# Patient Record
Sex: FEMALE | Race: WHITE | NOT HISPANIC OR LATINO | Employment: FULL TIME | URBAN - METROPOLITAN AREA
[De-identification: names, ages, dates, MRNs, and addresses within clinical notes are randomized per-mention and may not be internally consistent; named-entity substitution may affect disease eponyms.]

---

## 2018-06-05 ENCOUNTER — RECORDS - HEALTHEAST (OUTPATIENT)
Dept: LAB | Facility: CLINIC | Age: 48
End: 2018-06-05

## 2018-06-05 LAB
ALBUMIN SERPL-MCNC: 3.8 G/DL (ref 3.5–5)
ALP SERPL-CCNC: 92 U/L (ref 45–120)
ALT SERPL W P-5'-P-CCNC: 71 U/L (ref 0–45)
ANION GAP SERPL CALCULATED.3IONS-SCNC: 12 MMOL/L (ref 5–18)
AST SERPL W P-5'-P-CCNC: 119 U/L (ref 0–40)
BILIRUB SERPL-MCNC: 0.6 MG/DL (ref 0–1)
BUN SERPL-MCNC: 12 MG/DL (ref 8–22)
CALCIUM SERPL-MCNC: 9.3 MG/DL (ref 8.5–10.5)
CHLORIDE BLD-SCNC: 102 MMOL/L (ref 98–107)
CHOLEST SERPL-MCNC: 228 MG/DL
CO2 SERPL-SCNC: 24 MMOL/L (ref 22–31)
CREAT SERPL-MCNC: 0.72 MG/DL (ref 0.6–1.1)
ERYTHROCYTE [DISTWIDTH] IN BLOOD BY AUTOMATED COUNT: 13.2 % (ref 11–14.5)
FASTING STATUS PATIENT QL REPORTED: YES
GFR SERPL CREATININE-BSD FRML MDRD: >60 ML/MIN/1.73M2
GLUCOSE BLD-MCNC: 216 MG/DL (ref 70–125)
HCT VFR BLD AUTO: 44.2 % (ref 35–47)
HDLC SERPL-MCNC: 46 MG/DL
HGB BLD-MCNC: 14.3 G/DL (ref 12–16)
LDLC SERPL CALC-MCNC: 139 MG/DL
MCH RBC QN AUTO: 29.1 PG (ref 27–34)
MCHC RBC AUTO-ENTMCNC: 32.4 G/DL (ref 32–36)
MCV RBC AUTO: 90 FL (ref 80–100)
PLATELET # BLD AUTO: 259 THOU/UL (ref 140–440)
PMV BLD AUTO: 11.5 FL (ref 8.5–12.5)
POTASSIUM BLD-SCNC: 4.2 MMOL/L (ref 3.5–5)
PROT SERPL-MCNC: 7.4 G/DL (ref 6–8)
RBC # BLD AUTO: 4.92 MILL/UL (ref 3.8–5.4)
SODIUM SERPL-SCNC: 138 MMOL/L (ref 136–145)
T4 FREE SERPL-MCNC: 0.9 NG/DL (ref 0.7–1.8)
TRIGL SERPL-MCNC: 214 MG/DL
TSH SERPL DL<=0.005 MIU/L-ACNC: 13.62 UIU/ML (ref 0.3–5)
WBC: 5.3 THOU/UL (ref 4–11)

## 2019-07-09 ENCOUNTER — AMBULATORY - HEALTHEAST (OUTPATIENT)
Dept: CARDIOLOGY | Facility: CLINIC | Age: 49
End: 2019-07-09

## 2019-07-09 ENCOUNTER — RECORDS - HEALTHEAST (OUTPATIENT)
Dept: ADMINISTRATIVE | Facility: OTHER | Age: 49
End: 2019-07-09

## 2019-07-11 ENCOUNTER — OFFICE VISIT - HEALTHEAST (OUTPATIENT)
Dept: CARDIOLOGY | Facility: CLINIC | Age: 49
End: 2019-07-11

## 2019-07-11 DIAGNOSIS — R00.2 PALPITATIONS: ICD-10-CM

## 2019-07-11 DIAGNOSIS — I47.10 PAROXYSMAL SUPRAVENTRICULAR TACHYCARDIA (H): ICD-10-CM

## 2019-07-11 LAB
ATRIAL RATE - MUSE: 90 BPM
DIASTOLIC BLOOD PRESSURE - MUSE: NORMAL MMHG
INTERPRETATION ECG - MUSE: NORMAL
P AXIS - MUSE: 18 DEGREES
PR INTERVAL - MUSE: 156 MS
QRS DURATION - MUSE: 86 MS
QT - MUSE: 372 MS
QTC - MUSE: 455 MS
R AXIS - MUSE: 4 DEGREES
SYSTOLIC BLOOD PRESSURE - MUSE: NORMAL MMHG
T AXIS - MUSE: 34 DEGREES
VENTRICULAR RATE- MUSE: 90 BPM

## 2019-07-11 RX ORDER — LOSARTAN POTASSIUM AND HYDROCHLOROTHIAZIDE 12.5; 5 MG/1; MG/1
1 TABLET ORAL DAILY
Refills: 1 | Status: SHIPPED | COMMUNITY
Start: 2019-06-14

## 2019-07-11 RX ORDER — METOPROLOL TARTRATE 50 MG
50 TABLET ORAL 2 TIMES DAILY PRN
Qty: 10 TABLET | Refills: 3 | Status: SHIPPED | OUTPATIENT
Start: 2019-07-11 | End: 2023-01-27

## 2019-07-11 RX ORDER — LEVOTHYROXINE SODIUM 125 UG/1
125 TABLET ORAL DAILY
Refills: 1 | Status: SHIPPED | COMMUNITY
Start: 2019-06-14

## 2019-07-11 ASSESSMENT — MIFFLIN-ST. JEOR: SCORE: 1810.68

## 2019-07-22 ENCOUNTER — HOSPITAL ENCOUNTER (OUTPATIENT)
Dept: CARDIOLOGY | Facility: CLINIC | Age: 49
Discharge: HOME | End: 2019-07-22
Attending: INTERNAL MEDICINE

## 2019-07-22 DIAGNOSIS — I47.10 PAROXYSMAL SUPRAVENTRICULAR TACHYCARDIA (H): ICD-10-CM

## 2019-07-22 DIAGNOSIS — R00.2 PALPITATIONS: ICD-10-CM

## 2019-07-22 LAB
AORTIC ROOT: 3.1 CM
BSA FOR ECHO PROCEDURE: 2.32 M2
CV BLOOD PRESSURE: ABNORMAL MMHG
CV ECHO HEIGHT: 66 IN
CV ECHO WEIGHT: 256 LBS
DOP CALC LVOT PEAK VEL: 93.2 CM/S
DOP CALCLVOT PEAK VEL VTI: 18 CM
EJECTION FRACTION: 72 % (ref 55–75)
FRACTIONAL SHORTENING: 36.5 % (ref 28–44)
INTERVENTRICULAR SEPTUM IN END DIASTOLE: 1.1 CM (ref 0.6–0.9)
IVS/PW RATIO: 1.1
LA AREA 1: 11.6 CM2
LA AREA 2: 13.5 CM2
LEFT ATRIUM LENGTH: 4.15 CM
LEFT ATRIUM SIZE: 3.8 CM
LEFT ATRIUM TO AORTIC ROOT RATIO: 1.23 NO UNITS
LEFT ATRIUM VOLUME INDEX: 13.8 ML/M2
LEFT ATRIUM VOLUME: 32.1 ML
LEFT VENTRICLE DIASTOLIC VOLUME INDEX: 20.3 CM3/M2 (ref 29–61)
LEFT VENTRICLE DIASTOLIC VOLUME: 47 CM3 (ref 46–106)
LEFT VENTRICLE HEART RATE: 86 BPM
LEFT VENTRICLE MASS INDEX: 80.8 G/M2
LEFT VENTRICLE SYSTOLIC VOLUME INDEX: 5.6 CM3/M2 (ref 8–24)
LEFT VENTRICLE SYSTOLIC VOLUME: 13 CM3 (ref 14–42)
LEFT VENTRICULAR INTERNAL DIMENSION IN DIASTOLE: 4.91 CM (ref 3.8–5.2)
LEFT VENTRICULAR INTERNAL DIMENSION IN SYSTOLE: 3.12 CM (ref 2.2–3.5)
LEFT VENTRICULAR MASS: 187.5 G
LEFT VENTRICULAR OUTFLOW TRACT MEAN GRADIENT: 2 MMHG
LEFT VENTRICULAR OUTFLOW TRACT MEAN VELOCITY: 69.1 CM/S
LEFT VENTRICULAR OUTFLOW TRACT PEAK GRADIENT: 3 MMHG
LEFT VENTRICULAR POSTERIOR WALL IN END DIASTOLE: 0.99 CM (ref 0.6–0.9)
MITRAL VALVE E/A RATIO: 1.1
MV AVERAGE E/E' RATIO: 7 CM/S
MV DECELERATION TIME: 243 MS
MV E'TISSUE VEL-LAT: 14.9 CM/S
MV E'TISSUE VEL-MED: 7.51 CM/S
MV LATERAL E/E' RATIO: 5.3
MV MEDIAL E/E' RATIO: 10.5
MV PEAK A VELOCITY: 71.1 CM/S
MV PEAK E VELOCITY: 78.5 CM/S
NUC REST DIASTOLIC VOLUME INDEX: 4096 LBS
NUC REST SYSTOLIC VOLUME INDEX: 66 IN
TRICUSPID VALVE ANULAR PLANE SYSTOLIC EXCURSION: 1.7 CM

## 2019-07-22 ASSESSMENT — MIFFLIN-ST. JEOR: SCORE: 1807.96

## 2019-08-09 ENCOUNTER — COMMUNICATION - HEALTHEAST (OUTPATIENT)
Dept: CARDIOLOGY | Facility: CLINIC | Age: 49
End: 2019-08-09

## 2019-10-02 ENCOUNTER — RECORDS - HEALTHEAST (OUTPATIENT)
Dept: LAB | Facility: CLINIC | Age: 49
End: 2019-10-02

## 2019-10-02 LAB
ALBUMIN SERPL-MCNC: 4 G/DL (ref 3.5–5)
ALP SERPL-CCNC: 95 U/L (ref 45–120)
ALT SERPL W P-5'-P-CCNC: 53 U/L (ref 0–45)
ANION GAP SERPL CALCULATED.3IONS-SCNC: 9 MMOL/L (ref 5–18)
AST SERPL W P-5'-P-CCNC: 69 U/L (ref 0–40)
BILIRUB SERPL-MCNC: 0.6 MG/DL (ref 0–1)
BUN SERPL-MCNC: 10 MG/DL (ref 8–22)
CALCIUM SERPL-MCNC: 9.9 MG/DL (ref 8.5–10.5)
CHLORIDE BLD-SCNC: 98 MMOL/L (ref 98–107)
CHOLEST SERPL-MCNC: 232 MG/DL
CO2 SERPL-SCNC: 30 MMOL/L (ref 22–31)
CREAT SERPL-MCNC: 0.71 MG/DL (ref 0.6–1.1)
FASTING STATUS PATIENT QL REPORTED: ABNORMAL
GFR SERPL CREATININE-BSD FRML MDRD: >60 ML/MIN/1.73M2
GLUCOSE BLD-MCNC: 180 MG/DL (ref 70–125)
HDLC SERPL-MCNC: 51 MG/DL
LDLC SERPL CALC-MCNC: 117 MG/DL
POTASSIUM BLD-SCNC: 4.2 MMOL/L (ref 3.5–5)
PROT SERPL-MCNC: 7.8 G/DL (ref 6–8)
SODIUM SERPL-SCNC: 137 MMOL/L (ref 136–145)
TRIGL SERPL-MCNC: 320 MG/DL
TSH SERPL DL<=0.005 MIU/L-ACNC: 12.09 UIU/ML (ref 0.3–5)

## 2019-10-03 LAB — HBA1C MFR BLD: 10.2 % (ref 4.2–6.1)

## 2020-01-10 ENCOUNTER — RECORDS - HEALTHEAST (OUTPATIENT)
Dept: LAB | Facility: CLINIC | Age: 50
End: 2020-01-10

## 2020-01-11 LAB
ALBUMIN SERPL-MCNC: 3.8 G/DL (ref 3.5–5)
ALP SERPL-CCNC: 140 U/L (ref 45–120)
ALT SERPL W P-5'-P-CCNC: 36 U/L (ref 0–45)
ANION GAP SERPL CALCULATED.3IONS-SCNC: 13 MMOL/L (ref 5–18)
AST SERPL W P-5'-P-CCNC: 32 U/L (ref 0–40)
BILIRUB SERPL-MCNC: 0.5 MG/DL (ref 0–1)
BUN SERPL-MCNC: 19 MG/DL (ref 8–22)
CALCIUM SERPL-MCNC: 10.5 MG/DL (ref 8.5–10.5)
CHLORIDE BLD-SCNC: 97 MMOL/L (ref 98–107)
CHOLEST SERPL-MCNC: 144 MG/DL
CO2 SERPL-SCNC: 30 MMOL/L (ref 22–31)
CREAT SERPL-MCNC: 0.75 MG/DL (ref 0.6–1.1)
FASTING STATUS PATIENT QL REPORTED: ABNORMAL
GFR SERPL CREATININE-BSD FRML MDRD: >60 ML/MIN/1.73M2
GLUCOSE BLD-MCNC: 102 MG/DL (ref 70–125)
HDLC SERPL-MCNC: 39 MG/DL
LDLC SERPL CALC-MCNC: 62 MG/DL
POTASSIUM BLD-SCNC: 4.3 MMOL/L (ref 3.5–5)
PROT SERPL-MCNC: 8.2 G/DL (ref 6–8)
SODIUM SERPL-SCNC: 140 MMOL/L (ref 136–145)
TRIGL SERPL-MCNC: 216 MG/DL
TSH SERPL DL<=0.005 MIU/L-ACNC: 1.43 UIU/ML (ref 0.3–5)

## 2020-05-21 ENCOUNTER — RECORDS - HEALTHEAST (OUTPATIENT)
Dept: LAB | Facility: CLINIC | Age: 50
End: 2020-05-21

## 2020-05-24 LAB
BACTERIA SPEC CULT: ABNORMAL
BACTERIA SPEC CULT: ABNORMAL

## 2020-07-28 ENCOUNTER — RECORDS - HEALTHEAST (OUTPATIENT)
Dept: LAB | Facility: CLINIC | Age: 50
End: 2020-07-28

## 2020-07-29 LAB — COVID-19 ANTIBODY IGG: NEGATIVE

## 2020-11-30 ENCOUNTER — RECORDS - HEALTHEAST (OUTPATIENT)
Dept: LAB | Facility: CLINIC | Age: 50
End: 2020-11-30

## 2020-11-30 LAB
ALBUMIN SERPL-MCNC: 4.2 G/DL (ref 3.5–5)
ALP SERPL-CCNC: 97 U/L (ref 45–120)
ALT SERPL W P-5'-P-CCNC: 22 U/L (ref 0–45)
ANION GAP SERPL CALCULATED.3IONS-SCNC: 12 MMOL/L (ref 5–18)
AST SERPL W P-5'-P-CCNC: 19 U/L (ref 0–40)
BILIRUB SERPL-MCNC: 0.4 MG/DL (ref 0–1)
BUN SERPL-MCNC: 18 MG/DL (ref 8–22)
CALCIUM SERPL-MCNC: 9.9 MG/DL (ref 8.5–10.5)
CHLORIDE BLD-SCNC: 103 MMOL/L (ref 98–107)
CHOLEST SERPL-MCNC: 185 MG/DL
CO2 SERPL-SCNC: 25 MMOL/L (ref 22–31)
CREAT SERPL-MCNC: 0.7 MG/DL (ref 0.6–1.1)
FASTING STATUS PATIENT QL REPORTED: ABNORMAL
GFR SERPL CREATININE-BSD FRML MDRD: >60 ML/MIN/1.73M2
GLUCOSE BLD-MCNC: 117 MG/DL (ref 70–125)
HDLC SERPL-MCNC: 56 MG/DL
LDLC SERPL CALC-MCNC: 92 MG/DL
POTASSIUM BLD-SCNC: 4.1 MMOL/L (ref 3.5–5)
PROT SERPL-MCNC: 7.7 G/DL (ref 6–8)
SODIUM SERPL-SCNC: 140 MMOL/L (ref 136–145)
TRIGL SERPL-MCNC: 187 MG/DL
TSH SERPL DL<=0.005 MIU/L-ACNC: 0.52 UIU/ML (ref 0.3–5)

## 2021-05-26 ENCOUNTER — RECORDS - HEALTHEAST (OUTPATIENT)
Dept: ADMINISTRATIVE | Facility: CLINIC | Age: 51
End: 2021-05-26

## 2021-05-30 ENCOUNTER — RECORDS - HEALTHEAST (OUTPATIENT)
Dept: ADMINISTRATIVE | Facility: CLINIC | Age: 51
End: 2021-05-30

## 2021-05-30 NOTE — PATIENT INSTRUCTIONS - HE
1. A regular, moderate, exercise regimen consisting of a brisk walk for 2030 minutes on a daily basis is beneficial from multiple standpoints, and a good habit for life.  2. We will check an echocardiogram to make sure there is no evidence of structural heart disease  3. Use the vagal maneuvers we discussed if these palpitations are recurrent.  4. Metoprolol 50 mg can be used to help interrupt these episodes, but it will take 30 minutes or more to have an effect  5. Follow-up if these episodes are quickly recurrent, and bothersome.  6. Given your family history, monitoring your cholesterol with treatment of  hyperlipidemia is suggested.

## 2021-05-30 NOTE — PROGRESS NOTES
CARDIOLOGY RAPID ACCESS CLINIC CONSULT NOTE     Assessment/Plan:   1. Paroxysmal supraventricular tachycardia and a 48-year-old woman with no other evidence of structural heart disease.  We will check an echocardiogram to look for evidence of structural heart disease.  Discussed options of PRN or continuous pharmacologic therapy versus ablation procedure, reviewed vagal maneuvers.  2. Morbid obesity, sedentary lifestyle.  Advised regular moderate exercise program    Follow up as needed for recurrent episodes     History of Present Illness:     It is my pleasure to see Dolly Mckeon at the [facility] clinic for evaluation of paroxysmal tachycardia.    Dolly Mckeon is a 48 y.o. female with a past medical history of hypertension for 10 years, hypothyroidism on supplement which she has not been taking for the last couple of months, and morbid obesity.  She is been in her normal state of health until last week when she noted the onset of a racing heartbeat confirmed by an dilip on her phone.  She had mild substernal chest discomfort at this time which she had not experienced previously.  She presented to the emergency room where she was found to have heart rate in the 140s.  With vagal maneuver she spontaneously reverted to sinus rhythm.    She does not engage in any regular exercise.  She does snore but denies daytime sleepiness.  She reports rare alcohol intake.  She has not noticed any change in her activity tolerance.  She does drink 1 iced tea daily which she has stopped since last week.  She has noticed more skipped beats recently, while at rest, but has never had any sustained palpitations previously.  She denies dyspnea climbing her steps at home.  She does report increased job stress recently    Past Medical History:     Patient Active Problem List   Diagnosis     Paroxysmal supraventricular tachycardia (H)     Palpitations       Past Surgical History:   No past surgical history on  "file.    Family History:     Family History   Problem Relation Age of Onset     Sudden death Mother 67     CABG Father 60     Valvular heart disease Father          Social History:    reports that she has never smoked. She has never used smokeless tobacco.  Works for a [employer], taking orders.    Exercise: Minimal    Sleep:.  Snores, but no daytime sleepiness.    Meds:     Current Outpatient Medications on File Prior to Visit   Medication Sig Dispense Refill     levothyroxine (SYNTHROID, LEVOTHROID) 125 MCG tablet Take 125 mcg by mouth daily.  1     losartan-hydrochlorothiazide (HYZAAR) 50-12.5 mg per tablet Take 1 tablet by mouth daily.  1     No current facility-administered medications on file prior to visit.        Allergies:   Patient has no known allergies.    Review of Systems:     General: WNL  Eyes: WNL  Ears/Nose/Throat: WNL  Lungs: WNL  Heart: Irregular Heartbeat(palpitations )  Stomach: WNL  Bladder: WNL  Muscle/Joints: WNL  Skin: WNL  Nervous System: WNL  Mental Health: WNL     Blood: WNL        Objective:      Physical Exam  (!) 256 lb 9.6 oz (116.4 kg)  5' 6\" (1.676 m)  Body mass index is 41.42 kg/m .  /82 (Patient Site: Left Arm, Patient Position: Sitting, Cuff Size: Adult Large)   Pulse 88   Resp 16   Ht 5' 6\" (1.676 m)   Wt (!) 256 lb 9.6 oz (116.4 kg)   BMI 41.42 kg/m      General Appearance : Awake, Alert, No acute distress  HEENT: No Scleral icterus; the mucous membranes were pink and moist.  Conjunctivae bilaterally injected  Neck:  No cervical bruits, jugular venous distention, or thyromegaly .  Thick  Chest: The spine was straight  Lungs: Respirations unlabored; the lungs are clear to auscultation.  No wheezing   Cardiovascular:   Normal point of maximal impulse.  Auscultation reveals normal first and second heart sounds with no murmurs, rubs, or gallops.  Carotid, radial, and dorsalis pedal pulses are intact and symmetric.  Abdomen: Obese.  No organomegaly, masses, bruits, " or tenderness. Bowels sounds are present  Extremities: No edema.  Skin: No xanthelasma. Warm, Dry.  Musculoskeletal: No tenderness.  Neurologic: Alert and oriented ×3.      EKG(personally reviewed):  7/6/2019 21: 53 sinus rhythm at 94 bpm.  PACs.  Nonspecific ST segment changes               21: 29 supraventricular tachycardia 146 bpm, nonspecific ST and T wave changes    Cardiac Imaging Studies:      Lab Review   Lab Results   Component Value Date     (L) 07/06/2019    K 3.5 07/06/2019    CL 96 (L) 07/06/2019    CO2 24 07/06/2019    BUN 12 07/06/2019    CREATININE 1.05 07/06/2019    CALCIUM 10.9 (H) 07/06/2019     Lab Results   Component Value Date    WBC 9.8 07/06/2019    WBC 7.0 09/29/2015    HGB 16.3 (H) 07/06/2019    HCT 48.5 (H) 07/06/2019    MCV 89 07/06/2019     07/06/2019     Lab Results   Component Value Date    CHOL 228 (H) 06/05/2018    TRIG 214 (H) 06/05/2018    HDL 46 (L) 06/05/2018    LDLCALC 139 (H) 06/05/2018     Lab Results   Component Value Date    TROPONINI 0.02 07/07/2019     No results found for: BNP  Lab Results   Component Value Date    TSH 13.62 (H) 06/05/2018       Wero Stevenson MD [facility]    His note created using Dragon voice recognition software. Sound alike errors may have escaped editing.

## 2021-05-31 ENCOUNTER — RECORDS - HEALTHEAST (OUTPATIENT)
Dept: ADMINISTRATIVE | Facility: CLINIC | Age: 51
End: 2021-05-31

## 2021-06-01 ENCOUNTER — RECORDS - HEALTHEAST (OUTPATIENT)
Dept: ADMINISTRATIVE | Facility: CLINIC | Age: 51
End: 2021-06-01

## 2021-06-03 VITALS — BODY MASS INDEX: 41.14 KG/M2 | HEIGHT: 66 IN | WEIGHT: 256 LBS

## 2021-06-03 VITALS — HEIGHT: 66 IN | BODY MASS INDEX: 41.24 KG/M2 | WEIGHT: 256.6 LBS

## 2021-06-16 PROBLEM — R00.2 PALPITATIONS: Status: ACTIVE | Noted: 2019-07-11

## 2021-06-16 PROBLEM — I47.10 PAROXYSMAL SUPRAVENTRICULAR TACHYCARDIA (H): Status: ACTIVE | Noted: 2019-07-11

## 2021-06-19 NOTE — LETTER
Letter by Shayna Yarbrough, RN at      Author: Shayna Yarbrough RN Service: -- Author Type: --    Filed:  Encounter Date: 8/9/2019 Status: (Other)         Dolly Mckeon  [address]  [place] [address]             August 9, 2019         Dear Ms. Mckeon,    Below are the results from your recent visit:    Resulted Orders   Echo Complete   Result Value Ref Range    LV volume diastolic 47 46 - 106 cm3    LV volume systolic 13 (!) 14 - 42 cm3    IVSd 1.1 (!) 0.6 - 0.9 cm    LVIDd 4.91 3.8 - 5.2 cm    LVIDs 3.12 2.2 - 3.5 cm    LVOT mean gradient 2 mmHg    LVOT peak VTI 18 cm    LVOT mean mena 69.1 cm/s    LVOT peak mena 93.2 cm/s    LVOT peak gradient 3 mmHg    LV PWd 0.99 (!) 0.6 - 0.9 cm    MV E' lat mena 14.9 cm/s    MV E' med mena 7.51 cm/s    AO root 3.1 cm    LA size 3.8 cm    LA/AO root ratio 1.23 no units    MV decel time 243 ms    MV peak A mena 71.1 cm/s    MV peak E mena 78.5 cm/s    LA area 1 11.6 cm2    LA length 4.15 cm    LA area 2 13.5 cm2    TAPSE 1.7 cm    BSA 2.32 m2    Hieght 66 in    Weight 4,096 lbs    /76 mmHg    HR 86 bpm    IVS/PW ratio 1.1     LV FS 36.5 28 - 44 %    Echo LVEF calculated 72 55 - 75 %    LA volume 32.1 mL    LV mass 187.5 g    MV E/A Ratio 1.1     LV systolic volume index 5.6 8 - 24 cm3/m2    LV diastolic volume index 20.3 29 - 61 cm3/m2    LA volume index 13.8 mL/m2    LV mass index 80.8 g/m2    MV med E/e' ratio 10.5     MV lat E/e' ratio 5.3     Height 66.0 in    Weight 256 lbs    MV Avg E/e' Ratio 7.0 cm/s    Narrative    1. Normal left ventricular size and systolic performance with a visually   estimated ejection fraction of 60-65%.   2. No significant valvular heart disease is identified on this study.   3. Normal right ventricular size and systolic performance.      Per Dr. Stevenson: Results reviewed. No changes in plan. Continue present treatment. Follow up as needed.    Please call with questions or contact us using  [employer].    Sincerely,        Electronically signed by Shayna Yarbrough RN

## 2022-04-20 ENCOUNTER — LAB REQUISITION (OUTPATIENT)
Dept: LAB | Facility: CLINIC | Age: 52
End: 2022-04-20

## 2022-04-20 DIAGNOSIS — I10 ESSENTIAL (PRIMARY) HYPERTENSION: ICD-10-CM

## 2022-04-20 DIAGNOSIS — E03.9 HYPOTHYROIDISM, UNSPECIFIED: ICD-10-CM

## 2022-04-20 DIAGNOSIS — E78.1 PURE HYPERGLYCERIDEMIA: ICD-10-CM

## 2022-04-20 LAB
ALBUMIN SERPL-MCNC: 4 G/DL (ref 3.5–5)
ALP SERPL-CCNC: 92 U/L (ref 45–120)
ALT SERPL W P-5'-P-CCNC: 14 U/L (ref 0–45)
ANION GAP SERPL CALCULATED.3IONS-SCNC: 11 MMOL/L (ref 5–18)
AST SERPL W P-5'-P-CCNC: 16 U/L (ref 0–40)
BILIRUB SERPL-MCNC: 0.6 MG/DL (ref 0–1)
BUN SERPL-MCNC: 16 MG/DL (ref 8–22)
CALCIUM SERPL-MCNC: 9.3 MG/DL (ref 8.5–10.5)
CHLORIDE BLD-SCNC: 100 MMOL/L (ref 98–107)
CHOLEST SERPL-MCNC: 179 MG/DL
CO2 SERPL-SCNC: 26 MMOL/L (ref 22–31)
CREAT SERPL-MCNC: 0.7 MG/DL (ref 0.6–1.1)
GFR SERPL CREATININE-BSD FRML MDRD: >90 ML/MIN/1.73M2
GLUCOSE BLD-MCNC: 123 MG/DL (ref 70–125)
HDLC SERPL-MCNC: 52 MG/DL
LDLC SERPL CALC-MCNC: 87 MG/DL
POTASSIUM BLD-SCNC: 4 MMOL/L (ref 3.5–5)
PROT SERPL-MCNC: 7.3 G/DL (ref 6–8)
SODIUM SERPL-SCNC: 137 MMOL/L (ref 136–145)
TRIGL SERPL-MCNC: 200 MG/DL
TSH SERPL DL<=0.005 MIU/L-ACNC: 2.06 UIU/ML (ref 0.3–5)

## 2022-04-20 PROCEDURE — 80053 COMPREHEN METABOLIC PANEL: CPT | Performed by: NURSE PRACTITIONER

## 2022-04-20 PROCEDURE — 84443 ASSAY THYROID STIM HORMONE: CPT | Performed by: NURSE PRACTITIONER

## 2022-04-20 PROCEDURE — 80061 LIPID PANEL: CPT | Performed by: NURSE PRACTITIONER

## 2023-01-06 ENCOUNTER — LAB REQUISITION (OUTPATIENT)
Dept: LAB | Facility: CLINIC | Age: 53
End: 2023-01-06

## 2023-01-06 ENCOUNTER — TRANSFERRED RECORDS (OUTPATIENT)
Dept: HEALTH INFORMATION MANAGEMENT | Facility: CLINIC | Age: 53
End: 2023-01-06
Payer: COMMERCIAL

## 2023-01-06 DIAGNOSIS — E03.9 HYPOTHYROIDISM, UNSPECIFIED: ICD-10-CM

## 2023-01-06 DIAGNOSIS — I10 ESSENTIAL (PRIMARY) HYPERTENSION: ICD-10-CM

## 2023-01-06 LAB
ALBUMIN SERPL BCG-MCNC: 4.5 G/DL (ref 3.5–5.2)
ALP SERPL-CCNC: 102 U/L (ref 35–104)
ALT SERPL W P-5'-P-CCNC: 25 U/L (ref 10–35)
ANION GAP SERPL CALCULATED.3IONS-SCNC: 16 MMOL/L (ref 7–15)
AST SERPL W P-5'-P-CCNC: 20 U/L (ref 10–35)
BASOPHILS # BLD AUTO: 0 10E3/UL (ref 0–0.2)
BASOPHILS NFR BLD AUTO: 1 %
BILIRUB SERPL-MCNC: 0.4 MG/DL
BUN SERPL-MCNC: 14 MG/DL (ref 6–20)
CALCIUM SERPL-MCNC: 9.4 MG/DL (ref 8.6–10)
CHLORIDE SERPL-SCNC: 101 MMOL/L (ref 98–107)
CHOLEST SERPL-MCNC: 186 MG/DL
CREAT SERPL-MCNC: 0.69 MG/DL (ref 0.51–0.95)
DEPRECATED HCO3 PLAS-SCNC: 24 MMOL/L (ref 22–29)
EOSINOPHIL # BLD AUTO: 0.1 10E3/UL (ref 0–0.7)
EOSINOPHIL NFR BLD AUTO: 2 %
ERYTHROCYTE [DISTWIDTH] IN BLOOD BY AUTOMATED COUNT: 12.9 % (ref 10–15)
GFR SERPL CREATININE-BSD FRML MDRD: >90 ML/MIN/1.73M2
GLUCOSE SERPL-MCNC: 159 MG/DL (ref 70–99)
HCT VFR BLD AUTO: 44.6 % (ref 35–47)
HDLC SERPL-MCNC: 51 MG/DL
HGB BLD-MCNC: 14.3 G/DL (ref 11.7–15.7)
IMM GRANULOCYTES # BLD: 0 10E3/UL
IMM GRANULOCYTES NFR BLD: 1 %
LDLC SERPL CALC-MCNC: 91 MG/DL
LYMPHOCYTES # BLD AUTO: 1.7 10E3/UL (ref 0.8–5.3)
LYMPHOCYTES NFR BLD AUTO: 27 %
MCH RBC QN AUTO: 28.3 PG (ref 26.5–33)
MCHC RBC AUTO-ENTMCNC: 32.1 G/DL (ref 31.5–36.5)
MCV RBC AUTO: 88 FL (ref 78–100)
MONOCYTES # BLD AUTO: 0.4 10E3/UL (ref 0–1.3)
MONOCYTES NFR BLD AUTO: 7 %
NEUTROPHILS # BLD AUTO: 3.9 10E3/UL (ref 1.6–8.3)
NEUTROPHILS NFR BLD AUTO: 62 %
NONHDLC SERPL-MCNC: 135 MG/DL
NRBC # BLD AUTO: 0 10E3/UL
NRBC BLD AUTO-RTO: 0 /100
PLATELET # BLD AUTO: 282 10E3/UL (ref 150–450)
POTASSIUM SERPL-SCNC: 4.8 MMOL/L (ref 3.4–5.3)
PROT SERPL-MCNC: 7.2 G/DL (ref 6.4–8.3)
RBC # BLD AUTO: 5.05 10E6/UL (ref 3.8–5.2)
SODIUM SERPL-SCNC: 141 MMOL/L (ref 136–145)
TRIGL SERPL-MCNC: 219 MG/DL
TSH SERPL DL<=0.005 MIU/L-ACNC: 0.72 UIU/ML (ref 0.3–4.2)
WBC # BLD AUTO: 6.2 10E3/UL (ref 4–11)

## 2023-01-06 PROCEDURE — 84443 ASSAY THYROID STIM HORMONE: CPT | Performed by: NURSE PRACTITIONER

## 2023-01-06 PROCEDURE — 82947 ASSAY GLUCOSE BLOOD QUANT: CPT | Performed by: NURSE PRACTITIONER

## 2023-01-06 PROCEDURE — 80053 COMPREHEN METABOLIC PANEL: CPT | Performed by: NURSE PRACTITIONER

## 2023-01-06 PROCEDURE — 80061 LIPID PANEL: CPT | Performed by: NURSE PRACTITIONER

## 2023-01-06 PROCEDURE — 85025 COMPLETE CBC W/AUTO DIFF WBC: CPT | Performed by: NURSE PRACTITIONER

## 2023-01-27 ENCOUNTER — OFFICE VISIT (OUTPATIENT)
Dept: CARDIOLOGY | Facility: CLINIC | Age: 53
End: 2023-01-27
Payer: COMMERCIAL

## 2023-01-27 VITALS
RESPIRATION RATE: 16 BRPM | HEIGHT: 66 IN | BODY MASS INDEX: 40.37 KG/M2 | HEART RATE: 92 BPM | SYSTOLIC BLOOD PRESSURE: 158 MMHG | DIASTOLIC BLOOD PRESSURE: 84 MMHG | WEIGHT: 251.2 LBS

## 2023-01-27 DIAGNOSIS — E66.01 MORBID OBESITY (H): ICD-10-CM

## 2023-01-27 DIAGNOSIS — I47.10 PAROXYSMAL SUPRAVENTRICULAR TACHYCARDIA (H): Primary | ICD-10-CM

## 2023-01-27 PROCEDURE — 99203 OFFICE O/P NEW LOW 30 MIN: CPT | Performed by: INTERNAL MEDICINE

## 2023-01-27 RX ORDER — ATORVASTATIN CALCIUM 10 MG/1
10 TABLET, FILM COATED ORAL DAILY
COMMUNITY

## 2023-01-27 RX ORDER — ASPIRIN 81 MG/1
81 TABLET ORAL DAILY
COMMUNITY

## 2023-01-27 RX ORDER — METOPROLOL SUCCINATE 25 MG/1
25 TABLET, EXTENDED RELEASE ORAL DAILY
Qty: 90 TABLET | Refills: 3 | Status: SHIPPED | OUTPATIENT
Start: 2023-01-27

## 2023-01-27 NOTE — PROGRESS NOTES
CARDIOLOGY CLINIC CONSULT NOTE     Assessment/Plan:   1.  Paroxysmal supraventricular tachycardia.  Discussed options of suppressing medication, as needed medications for recurrent episodes, continue use of vagal maneuvers to terminate episodes, or an ablation procedure.  She wishes to avoid ablation at this time and will start metoprolol succinate 25 mg daily.  She was encouraged to continue her efforts at exercise, diet, avoiding alcohol excess caffeine excess.  If she has recurrent episodes on this regimen then she agrees to pursue electrophysiology evaluation for an ablation procedure.    Follow-up as needed for recurrent symptoms     History of Present Illness:     It is my pleasure to see Dolly Mckeon at the [facility] clinic for evaluation of paroxysmal supraventricular tachycardia.    Dolly Mckeon is a 52 year old female with a past medical history of primary tension, hypothyroidism on supplementation, and morbid obesity.  She presented nearly 4 years ago with an episode of supraventricular tachycardia, converting to sinus rhythm following vagal maneuvers.  Echocardiogram at that time disclosed no structural abnormalities.  She was prescribed as needed metoprolol, and instructed in vagal maneuvers.  Had no recurrence changed jobs last fall and had about 6 episodes over the last 6 months.  Episodes consist of a racing heart rate and heart rates in 150 to 187 bpm per her monitor.  They last about 15 minutes until she is able to get to a place where she can do a Valsalva maneuver accompanied by laying back and hanging her head down, which seem to bring about termination of the episodes.  This results in immediate relief of her symptoms, but forces her to leave work to do this.    She never had a chance to try the as needed metoprolol, as the prescription  and she was not having episodes.    Over the last 6 weeks, she resumed her regular exercise regimen, cut back on her  "sodium intake, has limited her caffeine and alcohol intake, she has had no recurrences.    Father underwent ablation for SVT.  She does not wish to pursue this if at all possible.    Past Medical History:     Patient Active Problem List   Diagnosis     Paroxysmal supraventricular tachycardia (H)     Palpitations       Past Surgical History:   No past surgical history on file.    Family History:     Family History   Problem Relation Age of Onset     Sudden Death Mother 67.00     CABG Father 60.00     Valvular heart disease Father      Family history reviewed and is not pertinent to the chief complaint or presenting problem    Social History:    reports that she has never smoked. She has never used smokeless tobacco.    Exercise: Walks on the treadmill 2 or 3 times a week for 20 minutes over the last 6 weeks    Sleep: Generally restorative    Meds:     Current Outpatient Medications   Medication Sig Dispense Refill     aspirin 81 MG EC tablet Take 81 mg by mouth daily       atorvastatin (LIPITOR) 10 MG tablet Take 10 mg by mouth daily       levothyroxine (SYNTHROID, LEVOTHROID) 125 MCG tablet [LEVOTHYROXINE (SYNTHROID, LEVOTHROID) 125 MCG TABLET] Take 125 mcg by mouth daily.  1     losartan-hydrochlorothiazide (HYZAAR) 50-12.5 mg per tablet [LOSARTAN-HYDROCHLOROTHIAZIDE (HYZAAR) 50-12.5 MG PER TABLET] Take 1 tablet by mouth daily.  1     metFORMIN (GLUCOPHAGE) 500 MG tablet Take 500 mg by mouth 2 times daily (with meals)         Allergies:   Patient has no known allergies.    Review of Systems:           Please refer above for cardiac ROS details.       Objective:      Physical Exam  113.9 kg (251 lb 3.2 oz)  1.676 m (5' 6\")  Body mass index is 40.54 kg/m .  BP (!) 158/84 (BP Location: Right arm, Patient Position: Sitting, Cuff Size: Adult Large)   Pulse 92   Resp 16   Ht 1.676 m (5' 6\")   Wt 113.9 kg (251 lb 3.2 oz)   BMI 40.54 kg/m    Weight is down 5 pounds from when I saw her last in 2019      General " Appearance : Awake, Alert, No acute distress  HEENT: No Scleral icterus; the mucous membranes were pink and moist.  Conjunctivae not injected  Neck:  No cervical bruits, jugular venous distention, or thyromegaly   Chest: The spine was straight. Chest wall symmetric  Lungs: Respirations unlabored; the lungs are clear to auscultation.  No wheezing   Cardiovascular: Nonpalpable point of maximal impulse.  Auscultation reveals normal first and second heart sounds with no murmurs, rubs, or gallops.  Carotid, radial, and dorsalis pedal pulses are intact and symmetric.    Abdomen: Obese.  No organomegaly, masses, bruits, or tenderness. Bowels sounds are present  Extremities: No edema  Skin: No xanthelasma. Warm, Dry.  Musculoskeletal: No tenderness.  Neurologic: Alert and oriented ×3. Speech is fluent.      EKG (personally reviewed):  7/6/2019 at 2129: Supraventricular tachycardia 146 bpm.  With nonspecific ST and T wave changes    Cardiac Imaging Studies:  Echocardiogram 7/2019:  1. Normal left ventricular size and systolic performance with a visually estimated ejection fraction of 60-65%.   2. No significant valvular heart disease is identified on this study.   3. Normal right ventricular size and systolic performance.     Lab Review   Lab Results   Component Value Date     01/06/2023    CO2 24 01/06/2023    CO2 26 04/20/2022    BUN 14.0 01/06/2023    BUN 16 04/20/2022     Lab Results   Component Value Date    WBC 6.2 01/06/2023    HGB 14.3 01/06/2023    HCT 44.6 01/06/2023    MCV 88 01/06/2023     01/06/2023     Lab Results   Component Value Date    CHOL 186 01/06/2023    TRIG 219 01/06/2023    HDL 51 01/06/2023     Lab Results   Component Value Date    TROPONINI 0.02 07/07/2019     No results found for: BNP  Lab Results   Component Value Date    TSH 0.72 01/06/2023    TSH 2.06 04/20/2022       Wero Stevenson MD, MD [facility]      This note created using Dragon voice recognition software. Sound alike errors  may have escaped editing.

## 2023-01-27 NOTE — LETTER
1/27/2023    Juaquin López MD  [address] [place]  [place] [address]    RE: Dolly CLAROS Linda       Dear Colleague,     I had the pleasure of seeing Dolly Mckeon in the [facility].    CARDIOLOGY CLINIC CONSULT NOTE     Assessment/Plan:   1.  Paroxysmal supraventricular tachycardia.  Discussed options of suppressing medication, as needed medications for recurrent episodes, continue use of vagal maneuvers to terminate episodes, or an ablation procedure.  She wishes to avoid ablation at this time and will start metoprolol succinate 25 mg daily.  She was encouraged to continue her efforts at exercise, diet, avoiding alcohol excess caffeine excess.  If she has recurrent episodes on this regimen then she agrees to pursue electrophysiology evaluation for an ablation procedure.    Follow-up as needed for recurrent symptoms     History of Present Illness:     It is my pleasure to see Dolly Mckeon at the [facility] clinic for evaluation of paroxysmal supraventricular tachycardia.    Dolly Mckeon is a 52 year old female with a past medical history of primary tension, hypothyroidism on supplementation, and morbid obesity.  She presented nearly 4 years ago with an episode of supraventricular tachycardia, converting to sinus rhythm following vagal maneuvers.  Echocardiogram at that time disclosed no structural abnormalities.  She was prescribed as needed metoprolol, and instructed in vagal maneuvers.  Had no recurrence changed jobs last fall and had about 6 episodes over the last 6 months.  Episodes consist of a racing heart rate and heart rates in 150 to 187 bpm per her monitor.  They last about 15 minutes until she is able to get to a place where she can do a Valsalva maneuver accompanied by laying back and hanging her head down, which seem to bring about termination of the episodes.  This results in immediate relief of her symptoms, but forces her to leave work  "to do this.    She never had a chance to try the as needed metoprolol, as the prescription  and she was not having episodes.    Over the last 6 weeks, she resumed her regular exercise regimen, cut back on her sodium intake, has limited her caffeine and alcohol intake, she has had no recurrences.    Father underwent ablation for SVT.  She does not wish to pursue this if at all possible.    Past Medical History:     Patient Active Problem List   Diagnosis     Paroxysmal supraventricular tachycardia (H)     Palpitations       Past Surgical History:   No past surgical history on file.    Family History:     Family History   Problem Relation Age of Onset     Sudden Death Mother 67.00     CABG Father 60.00     Valvular heart disease Father      Family history reviewed and is not pertinent to the chief complaint or presenting problem    Social History:    reports that she has never smoked. She has never used smokeless tobacco.    Exercise: Walks on the treadmill 2 or 3 times a week for 20 minutes over the last 6 weeks    Sleep: Generally restorative    Meds:     Current Outpatient Medications   Medication Sig Dispense Refill     aspirin 81 MG EC tablet Take 81 mg by mouth daily       atorvastatin (LIPITOR) 10 MG tablet Take 10 mg by mouth daily       levothyroxine (SYNTHROID, LEVOTHROID) 125 MCG tablet [LEVOTHYROXINE (SYNTHROID, LEVOTHROID) 125 MCG TABLET] Take 125 mcg by mouth daily.  1     losartan-hydrochlorothiazide (HYZAAR) 50-12.5 mg per tablet [LOSARTAN-HYDROCHLOROTHIAZIDE (HYZAAR) 50-12.5 MG PER TABLET] Take 1 tablet by mouth daily.  1     metFORMIN (GLUCOPHAGE) 500 MG tablet Take 500 mg by mouth 2 times daily (with meals)         Allergies:   Patient has no known allergies.    Review of Systems:           Please refer above for cardiac ROS details.       Objective:      Physical Exam  113.9 kg (251 lb 3.2 oz)  1.676 m (5' 6\")  Body mass index is 40.54 kg/m .  BP (!) 158/84 (BP Location: Right arm, Patient " "Position: Sitting, Cuff Size: Adult Large)   Pulse 92   Resp 16   Ht 1.676 m (5' 6\")   Wt 113.9 kg (251 lb 3.2 oz)   BMI 40.54 kg/m    Weight is down 5 pounds from when I saw her last in 2019      General Appearance : Awake, Alert, No acute distress  HEENT: No Scleral icterus; the mucous membranes were pink and moist.  Conjunctivae not injected  Neck:  No cervical bruits, jugular venous distention, or thyromegaly   Chest: The spine was straight. Chest wall symmetric  Lungs: Respirations unlabored; the lungs are clear to auscultation.  No wheezing   Cardiovascular: Nonpalpable point of maximal impulse.  Auscultation reveals normal first and second heart sounds with no murmurs, rubs, or gallops.  Carotid, radial, and dorsalis pedal pulses are intact and symmetric.    Abdomen: Obese.  No organomegaly, masses, bruits, or tenderness. Bowels sounds are present  Extremities: No edema  Skin: No xanthelasma. Warm, Dry.  Musculoskeletal: No tenderness.  Neurologic: Alert and oriented ×3. Speech is fluent.      EKG (personally reviewed):  7/6/2019 at 2129: Supraventricular tachycardia 146 bpm.  With nonspecific ST and T wave changes    Cardiac Imaging Studies:  Echocardiogram 7/2019:  1. Normal left ventricular size and systolic performance with a visually estimated ejection fraction of 60-65%.   2. No significant valvular heart disease is identified on this study.   3. Normal right ventricular size and systolic performance.     Lab Review   Lab Results   Component Value Date     01/06/2023    CO2 24 01/06/2023    CO2 26 04/20/2022    BUN 14.0 01/06/2023    BUN 16 04/20/2022     Lab Results   Component Value Date    WBC 6.2 01/06/2023    HGB 14.3 01/06/2023    HCT 44.6 01/06/2023    MCV 88 01/06/2023     01/06/2023     Lab Results   Component Value Date    CHOL 186 01/06/2023    TRIG 219 01/06/2023    HDL 51 01/06/2023     Lab Results   Component Value Date    TROPONINI 0.02 07/07/2019     No results found " for: BNP  Lab Results   Component Value Date    TSH 0.72 01/06/2023    TSH 2.06 04/20/2022       Wero Stevenson MD, MD [facility]      This note created using Dragon voice recognition software. Sound alike errors may have escaped editing.       Thank you for allowing me to participate in the care of your patient.      Sincerely,     Wero Stevenson MD     [facility]  cc:   Referred Self,

## 2023-01-27 NOTE — PATIENT INSTRUCTIONS
Congratulations with your exercise regimen!  Try to increase the frequency to get to 150 minutes each week.  Continue lower limited alcohol and caffeine intake and keep your self well-hydrated.  Start metoprolol succinate 25 mg daily  If your palpitations are recurrent, we will schedule an electrophysiology evaluation for ablation

## 2024-01-23 ENCOUNTER — LAB REQUISITION (OUTPATIENT)
Dept: LAB | Facility: CLINIC | Age: 54
End: 2024-01-23

## 2024-01-23 DIAGNOSIS — E03.9 HYPOTHYROIDISM, UNSPECIFIED: ICD-10-CM

## 2024-01-23 DIAGNOSIS — I10 ESSENTIAL (PRIMARY) HYPERTENSION: ICD-10-CM

## 2024-01-23 DIAGNOSIS — E78.1 PURE HYPERGLYCERIDEMIA: ICD-10-CM

## 2024-01-23 LAB
ALBUMIN SERPL BCG-MCNC: 4.5 G/DL (ref 3.5–5.2)
ALP SERPL-CCNC: 97 U/L (ref 40–150)
ALT SERPL W P-5'-P-CCNC: 33 U/L (ref 0–50)
ANION GAP SERPL CALCULATED.3IONS-SCNC: 15 MMOL/L (ref 7–15)
AST SERPL W P-5'-P-CCNC: 29 U/L (ref 0–45)
BILIRUB SERPL-MCNC: 0.5 MG/DL
BUN SERPL-MCNC: 15.1 MG/DL (ref 6–20)
CALCIUM SERPL-MCNC: 9.5 MG/DL (ref 8.6–10)
CHLORIDE SERPL-SCNC: 100 MMOL/L (ref 98–107)
CHOLEST SERPL-MCNC: 165 MG/DL
CREAT SERPL-MCNC: 0.58 MG/DL (ref 0.51–0.95)
DEPRECATED HCO3 PLAS-SCNC: 23 MMOL/L (ref 22–29)
EGFRCR SERPLBLD CKD-EPI 2021: >90 ML/MIN/1.73M2
FASTING STATUS PATIENT QL REPORTED: ABNORMAL
GLUCOSE SERPL-MCNC: 148 MG/DL (ref 70–99)
HDLC SERPL-MCNC: 50 MG/DL
LDLC SERPL CALC-MCNC: 66 MG/DL
NONHDLC SERPL-MCNC: 115 MG/DL
POTASSIUM SERPL-SCNC: 4 MMOL/L (ref 3.4–5.3)
PROT SERPL-MCNC: 7.8 G/DL (ref 6.4–8.3)
SODIUM SERPL-SCNC: 138 MMOL/L (ref 135–145)
TRIGL SERPL-MCNC: 245 MG/DL
TSH SERPL DL<=0.005 MIU/L-ACNC: 1.3 UIU/ML (ref 0.3–4.2)

## 2024-01-23 PROCEDURE — 80053 COMPREHEN METABOLIC PANEL: CPT | Performed by: NURSE PRACTITIONER

## 2024-01-23 PROCEDURE — 80061 LIPID PANEL: CPT | Performed by: NURSE PRACTITIONER

## 2024-01-23 PROCEDURE — 84443 ASSAY THYROID STIM HORMONE: CPT | Performed by: NURSE PRACTITIONER

## 2024-10-29 ENCOUNTER — LAB REQUISITION (OUTPATIENT)
Dept: LAB | Facility: CLINIC | Age: 54
End: 2024-10-29

## 2024-10-29 DIAGNOSIS — I10 ESSENTIAL (PRIMARY) HYPERTENSION: ICD-10-CM

## 2024-10-29 PROCEDURE — 80048 BASIC METABOLIC PNL TOTAL CA: CPT | Performed by: NURSE PRACTITIONER

## 2024-10-30 LAB
ANION GAP SERPL CALCULATED.3IONS-SCNC: 14 MMOL/L (ref 7–15)
BUN SERPL-MCNC: 11.5 MG/DL (ref 6–20)
CALCIUM SERPL-MCNC: 8.9 MG/DL (ref 8.8–10.4)
CHLORIDE SERPL-SCNC: 99 MMOL/L (ref 98–107)
CREAT SERPL-MCNC: 0.63 MG/DL (ref 0.51–0.95)
EGFRCR SERPLBLD CKD-EPI 2021: >90 ML/MIN/1.73M2
GLUCOSE SERPL-MCNC: 135 MG/DL (ref 70–99)
HCO3 SERPL-SCNC: 24 MMOL/L (ref 22–29)
POTASSIUM SERPL-SCNC: 4.1 MMOL/L (ref 3.4–5.3)
SODIUM SERPL-SCNC: 137 MMOL/L (ref 135–145)

## 2025-07-22 ENCOUNTER — LAB REQUISITION (OUTPATIENT)
Dept: LAB | Facility: CLINIC | Age: 55
End: 2025-07-22

## 2025-07-22 DIAGNOSIS — E78.1 PURE HYPERGLYCERIDEMIA: ICD-10-CM

## 2025-07-22 DIAGNOSIS — E11.9 TYPE 2 DIABETES MELLITUS WITHOUT COMPLICATIONS (H): ICD-10-CM

## 2025-07-22 DIAGNOSIS — E03.9 HYPOTHYROIDISM, UNSPECIFIED: ICD-10-CM

## 2025-07-22 DIAGNOSIS — I10 ESSENTIAL (PRIMARY) HYPERTENSION: ICD-10-CM

## 2025-07-22 LAB
ALBUMIN SERPL BCG-MCNC: 4.5 G/DL (ref 3.5–5.2)
ALP SERPL-CCNC: 96 U/L (ref 40–150)
ALT SERPL W P-5'-P-CCNC: 24 U/L (ref 0–50)
ANION GAP SERPL CALCULATED.3IONS-SCNC: 15 MMOL/L (ref 7–15)
AST SERPL W P-5'-P-CCNC: 24 U/L (ref 0–45)
BILIRUB SERPL-MCNC: 0.4 MG/DL
BUN SERPL-MCNC: 14.7 MG/DL (ref 6–20)
CALCIUM SERPL-MCNC: 9.7 MG/DL (ref 8.8–10.4)
CHLORIDE SERPL-SCNC: 99 MMOL/L (ref 98–107)
CHOLEST SERPL-MCNC: 168 MG/DL
CREAT SERPL-MCNC: 0.64 MG/DL (ref 0.51–0.95)
CREAT UR-MCNC: 88.2 MG/DL
EGFRCR SERPLBLD CKD-EPI 2021: >90 ML/MIN/1.73M2
ERYTHROCYTE [DISTWIDTH] IN BLOOD BY AUTOMATED COUNT: 12.8 % (ref 10–15)
FASTING STATUS PATIENT QL REPORTED: ABNORMAL
FASTING STATUS PATIENT QL REPORTED: ABNORMAL
GLUCOSE SERPL-MCNC: 104 MG/DL (ref 70–99)
HCO3 SERPL-SCNC: 24 MMOL/L (ref 22–29)
HCT VFR BLD AUTO: 44.2 % (ref 35–47)
HDLC SERPL-MCNC: 47 MG/DL
HGB BLD-MCNC: 14.7 G/DL (ref 11.7–15.7)
LDLC SERPL CALC-MCNC: 77 MG/DL
MCH RBC QN AUTO: 28.1 PG (ref 26.5–33)
MCHC RBC AUTO-ENTMCNC: 33.3 G/DL (ref 31.5–36.5)
MCV RBC AUTO: 85 FL (ref 78–100)
MICROALBUMIN UR-MCNC: <12 MG/L
MICROALBUMIN/CREAT UR: NORMAL MG/G{CREAT}
NONHDLC SERPL-MCNC: 121 MG/DL
PLATELET # BLD AUTO: 290 10E3/UL (ref 150–450)
POTASSIUM SERPL-SCNC: 4.2 MMOL/L (ref 3.4–5.3)
PROT SERPL-MCNC: 7.6 G/DL (ref 6.4–8.3)
RBC # BLD AUTO: 5.23 10E6/UL (ref 3.8–5.2)
SODIUM SERPL-SCNC: 138 MMOL/L (ref 135–145)
TRIGL SERPL-MCNC: 218 MG/DL
TSH SERPL DL<=0.005 MIU/L-ACNC: 1.03 UIU/ML (ref 0.3–4.2)
WBC # BLD AUTO: 6.3 10E3/UL (ref 4–11)

## 2025-07-22 PROCEDURE — 84443 ASSAY THYROID STIM HORMONE: CPT | Performed by: FAMILY MEDICINE

## 2025-07-22 PROCEDURE — 82247 BILIRUBIN TOTAL: CPT | Performed by: FAMILY MEDICINE

## 2025-07-22 PROCEDURE — 82043 UR ALBUMIN QUANTITATIVE: CPT | Performed by: FAMILY MEDICINE

## 2025-07-22 PROCEDURE — 80061 LIPID PANEL: CPT | Performed by: FAMILY MEDICINE

## 2025-07-22 PROCEDURE — 85018 HEMOGLOBIN: CPT | Performed by: FAMILY MEDICINE
